# Patient Record
Sex: MALE
[De-identification: names, ages, dates, MRNs, and addresses within clinical notes are randomized per-mention and may not be internally consistent; named-entity substitution may affect disease eponyms.]

---

## 2024-08-02 PROBLEM — Z00.00 ENCOUNTER FOR PREVENTIVE HEALTH EXAMINATION: Status: ACTIVE | Noted: 2024-08-02

## 2024-08-05 ENCOUNTER — APPOINTMENT (OUTPATIENT)
Dept: ENDOCRINOLOGY | Facility: CLINIC | Age: 79
End: 2024-08-05

## 2024-08-05 PROCEDURE — G2211 COMPLEX E/M VISIT ADD ON: CPT

## 2024-08-05 PROCEDURE — 82962 GLUCOSE BLOOD TEST: CPT

## 2024-08-05 PROCEDURE — 99205 OFFICE O/P NEW HI 60 MIN: CPT | Mod: 25

## 2024-08-05 PROCEDURE — 83036 HEMOGLOBIN GLYCOSYLATED A1C: CPT | Mod: QW

## 2024-08-05 NOTE — PHYSICAL EXAM
[Alert] : alert [Well Nourished] : well nourished [No Acute Distress] : no acute distress [Well Developed] : well developed [Normal Sclera/Conjunctiva] : normal sclera/conjunctiva [EOMI] : extra ocular movement intact [No Proptosis] : no proptosis [Normal Oropharynx] : the oropharynx was normal [Thyroid Not Enlarged] : the thyroid was not enlarged [No Thyroid Nodules] : no palpable thyroid nodules [No Respiratory Distress] : no respiratory distress [No Accessory Muscle Use] : no accessory muscle use [Clear to Auscultation] : lungs were clear to auscultation bilaterally [Normal S1, S2] : normal S1 and S2 [Normal Rate] : heart rate was normal [Regular Rhythm] : with a regular rhythm [No Edema] : no peripheral edema [Pedal Pulses Normal] : the pedal pulses are present [Normal Bowel Sounds] : normal bowel sounds [Not Tender] : non-tender [Not Distended] : not distended [Soft] : abdomen soft [Normal Anterior Cervical Nodes] : no anterior cervical lymphadenopathy [Normal Posterior Cervical Nodes] : no posterior cervical lymphadenopathy [No Spinal Tenderness] : no spinal tenderness [Spine Straight] : spine straight [No Stigmata of Cushings Syndrome] : no stigmata of Cushings Syndrome [Normal Gait] : normal gait [Normal Strength/Tone] : muscle strength and tone were normal [No Rash] : no rash [Acanthosis Nigricans] : no acanthosis nigricans [Right foot was examined, including] : right foot ~C was examined, including visual inspection with sensory and pulse exams [Left foot was examined, including] : left foot ~C was examined, including visual inspection with sensory and pulse exams [Normal] : normal [Full ROM] : with full range of motion [2+] : 2+ in the dorsalis pedis [Diminished Throughout Both Feet] : normal tactile sensation with monofilament testing throughout both feet [#1 Diminished] : number 1 was diminished [#2 Diminished] : number 2 was diminished [#3 Diminished] : number 3 was diminished [Normal Reflexes] : deep tendon reflexes were 2+ and symmetric [No Tremors] : no tremors [Oriented x3] : oriented to person, place, and time

## 2024-08-05 NOTE — HISTORY OF PRESENT ILLNESS
[FreeTextEntry1] : Mr. NELLY LOVING  is a 79 year old male with past medical history of Diabetes Mellitus Type 2, HTN  who presents for management of his diabetes. Patient was just recently diagnosed. He denies any blurry vision, polyuria, polydipsia but does have history of numbness/tingling in the extremities.   POCT Glucose: 202mg/dL POCT Hga1c: 6.4%   Diabetes first diagnosed:2024 Type:2  Current diabetic regimen: none Other relevant medications:  Self monitoring blood glucose : Glucometer: none  SMBG: Pre-breakfast: Pre-lunch: Pre-dinner: bedtime:  Hypoglycemia: none  Diet: Cut down on carbs   Exercise:none  Urine micro: lipid profile: last hba1c: eye exam:none diabetic foot exam/podiatry:NA

## 2024-08-05 NOTE — ASSESSMENT
[FreeTextEntry1] : 79 year old male with past medical history of Diabetes Mellitus Type 2, HTN  who presents for management of his diabetes  1. DM 2- controlled, uncomplicated Patient counseled on the importance of diabetic control and risk of complications. We discussed about microvascular disease and macrovascular disease. We discussed the importance of ophthalmology evaluations annually or more frequent as necessary. We also discussed the importance of diabetes foot care. Some form of glucose monitoring is always advised. Maintaining a low carbohydrate/ADA diet and physical activity was discussed. Patient's diet and the necessary changes discussed. Reviewed over glycemic goals. Recommended that we hold off on medication and work on lifestyle for now  Cont ADA diet Cont exercise Will refer to CDE/RD Labs at follow up Opthalmology Visit will follow up  Foot Exam up to date

## 2024-08-13 ENCOUNTER — APPOINTMENT (OUTPATIENT)
Dept: ENDOCRINOLOGY | Facility: CLINIC | Age: 79
End: 2024-08-13
Payer: COMMERCIAL

## 2024-08-13 DIAGNOSIS — E11.8 TYPE 2 DIABETES MELLITUS WITH UNSPECIFIED COMPLICATIONS: ICD-10-CM

## 2024-08-13 PROCEDURE — G0108 DIAB MANAGE TRN  PER INDIV: CPT
